# Patient Record
Sex: MALE | Race: WHITE
[De-identification: names, ages, dates, MRNs, and addresses within clinical notes are randomized per-mention and may not be internally consistent; named-entity substitution may affect disease eponyms.]

---

## 2018-12-16 ENCOUNTER — HOSPITAL ENCOUNTER (INPATIENT)
Dept: HOSPITAL 89 - ER | Age: 43
LOS: 5 days | Discharge: HOME | DRG: 195 | End: 2018-12-21
Attending: INTERNAL MEDICINE | Admitting: INTERNAL MEDICINE
Payer: SELF-PAY

## 2018-12-16 VITALS — DIASTOLIC BLOOD PRESSURE: 91 MMHG | SYSTOLIC BLOOD PRESSURE: 142 MMHG

## 2018-12-16 VITALS
WEIGHT: 155.5 LBS | BODY MASS INDEX: 21.06 KG/M2 | HEIGHT: 72 IN | BODY MASS INDEX: 21.06 KG/M2 | HEIGHT: 72 IN | WEIGHT: 155.5 LBS

## 2018-12-16 VITALS — SYSTOLIC BLOOD PRESSURE: 131 MMHG | DIASTOLIC BLOOD PRESSURE: 95 MMHG

## 2018-12-16 DIAGNOSIS — F17.210: ICD-10-CM

## 2018-12-16 DIAGNOSIS — R09.02: ICD-10-CM

## 2018-12-16 DIAGNOSIS — I10: ICD-10-CM

## 2018-12-16 DIAGNOSIS — J09.X1: Primary | ICD-10-CM

## 2018-12-16 LAB — PLATELET COUNT, AUTOMATED: 187 K/UL (ref 150–450)

## 2018-12-16 PROCEDURE — 82435 ASSAY OF BLOOD CHLORIDE: CPT

## 2018-12-16 PROCEDURE — 84460 ALANINE AMINO (ALT) (SGPT): CPT

## 2018-12-16 PROCEDURE — 96361 HYDRATE IV INFUSION ADD-ON: CPT

## 2018-12-16 PROCEDURE — 85025 COMPLETE CBC W/AUTO DIFF WBC: CPT

## 2018-12-16 PROCEDURE — 84132 ASSAY OF SERUM POTASSIUM: CPT

## 2018-12-16 PROCEDURE — 94640 AIRWAY INHALATION TREATMENT: CPT

## 2018-12-16 PROCEDURE — 84520 ASSAY OF UREA NITROGEN: CPT

## 2018-12-16 PROCEDURE — 71046 X-RAY EXAM CHEST 2 VIEWS: CPT

## 2018-12-16 PROCEDURE — 82310 ASSAY OF CALCIUM: CPT

## 2018-12-16 PROCEDURE — 82374 ASSAY BLOOD CARBON DIOXIDE: CPT

## 2018-12-16 PROCEDURE — 99284 EMERGENCY DEPT VISIT MOD MDM: CPT

## 2018-12-16 PROCEDURE — 84155 ASSAY OF PROTEIN SERUM: CPT

## 2018-12-16 PROCEDURE — 87502 INFLUENZA DNA AMP PROBE: CPT

## 2018-12-16 PROCEDURE — 96374 THER/PROPH/DIAG INJ IV PUSH: CPT

## 2018-12-16 PROCEDURE — 82565 ASSAY OF CREATININE: CPT

## 2018-12-16 PROCEDURE — 87040 BLOOD CULTURE FOR BACTERIA: CPT

## 2018-12-16 PROCEDURE — 84450 TRANSFERASE (AST) (SGOT): CPT

## 2018-12-16 PROCEDURE — 82947 ASSAY GLUCOSE BLOOD QUANT: CPT

## 2018-12-16 PROCEDURE — 82247 BILIRUBIN TOTAL: CPT

## 2018-12-16 PROCEDURE — 71045 X-RAY EXAM CHEST 1 VIEW: CPT

## 2018-12-16 PROCEDURE — 82040 ASSAY OF SERUM ALBUMIN: CPT

## 2018-12-16 PROCEDURE — 84075 ASSAY ALKALINE PHOSPHATASE: CPT

## 2018-12-16 PROCEDURE — 94667 MNPJ CHEST WALL 1ST: CPT

## 2018-12-16 PROCEDURE — 36415 COLL VENOUS BLD VENIPUNCTURE: CPT

## 2018-12-16 PROCEDURE — 83605 ASSAY OF LACTIC ACID: CPT

## 2018-12-16 PROCEDURE — 84295 ASSAY OF SERUM SODIUM: CPT

## 2018-12-16 RX ADMIN — THIAMINE HYDROCHLORIDE PRN MLS/HR: 100 INJECTION, SOLUTION INTRAMUSCULAR; INTRAVENOUS at 21:13

## 2018-12-16 NOTE — RADIOLOGY IMAGING REPORT
FACILITY: [facility] - [place] 

 

PATIENT NAME: Eliceo Cadena

: 1975

MR: [id number]

V: [id number]

EXAM DATE: 

ORDERING PHYSICIAN: ELIJAH WHEELER

TECHNOLOGIST: 

 

Location: [facility]

Patient: Eliceo Cadena

: 1975

MRN: [id number]

Visit/Account:[id number]

Date of Sevice: 2018

 

ACCESSION #: [id number]

 

EXAMINATION: Chest 2 Views

 

HISTORY:  Cough and fever

 

COMPARISON:  None.

 

FINDINGS:

Normal and symmetric lung volumes.  There are mixed interstitial and airspace opacities in the perihi
lar aspect of both lungs, greater on the right, suspicious for multifocal pneumonitis.

 

No pleural effusion or pneumothorax.

 

Normal cardiomediastinal silhouette.

 

Visualized osseous structures appear intact.

 

IMPRESSION:

Patchy perihilar infiltrates are suspicious for multifocal pneumonitis.

 

Report Dictated By: Raphael Pinedo MD at 2018 6:29 PM

 

Report E-Signed By: Raphael Pinedo MD  at 2018 6:31 PM

 

WSN:[id number]

## 2018-12-16 NOTE — HISTORY & PHYSICAL
History of Present Illness


Chief Complaint


"I feel terrible"


History of Present Illness


42yo male with PMHx significant for HTN, spontaneous PNTX s/p chest 


tube/pleurodesis. He is an  from [place] driving through the 


[facility]. He states he began feeling feverish, lightheaded, having 


HA/diffuse body aches, coughing approximately 3-4 days ago. He and his co-


have had the same symptoms. They stopped at a local truck stop to "get over" the


symptoms. Unfortunately, everything seemed to worsen including increasing d


yspnea, poor appetite, diarrhea. He contacted EMS and was transported to the [facility]


ER for evaluation. He was found to be hypoxic with bilateral perihilar 


infiltrates and influenza A positive. He was recommended for admission.





History


Problems:  


(1) HTN (hypertension)


Status:  Chronic


(2) Spontaneous pneumothorax


Status:  Resolved


Home Meds


No Active Prescriptions or Reported Meds


Allergies:  


Coded Allergies:  


     No Known Drug Allergies (Unverified , 18)


Other Social/Family Hx


He reports his family has been very healthy with no known problems.


Hx Smoking:  Yes


Smoking Status:  Current: Every Day Smoker (2ppd)


Hx Alcohol Use:  No





Review of Systems


Constitutional:  Fever, Chills


Neurological:  Weakness, Dizziness


Cardiovascular:  No Chest Pain


Respiratory:  Shortness of Breath, Cough


Gastrointestinal:  Diarrhea; No Hematemesis, No Hematochezia, No Melena, No 


Abdominal Pain


Genitourinary:  No Dysuria, No Hematuria


Musculoskeletal:  Pain





Exam


Vital Signs





Vital Signs








  Date Time  Temp Pulse Resp B/P (MAP) Pulse Ox O2 Delivery O2 Flow Rate FiO2


 


18 20:06 101.4       


 


18 20:04  110 7  91   


 


18 19:14      Nasal Cannula 3 


 


18 18:49    128/92 (104)    








General Appearance:  Alert, Awake


Neuro:  No Gross deficits


Eyes:  PERRLA, Other (sclera anicteric)


Neck:  No Masses


Cardiovascular:  Regular Rate and Rhythm, No Edema, No JVD


Respiratory:  Other (few scattered rhonchi)


Chest:  No Tenderness


GI:  Abd Soft and Non-Tender, Other (BS present)


:  No CVA Tenderness


Lymph:  No Adenopathy


Extremities:  Warm, Perfused


Integumentary:  Skin Intact without Lesion / Mass, Other (multiple tatoos)


Psych:  Alert & Oriented X3





Medical Decision Making


Data Points


Result Diagram:  


18 1740                                                                   


            18 1740














Item Value  Date Time


 


Albumin 4.4 g/dl 18 1740


 


Total Protein 7.8 g/dl 18 1740


 


Alkaline Phosphatase 67 U/L 18 1740


 


Alanine Aminotransferase (ALT/SGPT) 35 U/L 18 1740


 


Aspartate Amino Transf (AST/SGOT) 109 U/L H 18 1740


 


Total Bilirubin 0.7 mg/dl 18 1740


 


Calcium Level 9.4 mg/dl 18 1740


 


Lactate 1.9 mmol/L 18 174


 


Influenza Virus Type B (PCR) Negative 18 172


 


Influenza Virus Type A (PCR) Positive 18 172











EKG / Imaging


Imaging


PATIENT NAME: Eliceo Cadena


: 1975


MR: [id number]


V: [id number]


EXAM DATE: [id number]


ORDERING PHYSICIAN: ELIJAH WHEELER


TECHNOLOGIST: 


 


Location: [facility] [place]


Patient: Eliceo Cadena


: 1975


MRN: [id number]


Visit/Account:[id number]


Date of Sevice: 2018


 


ACCESSION #: [id number]


 


EXAMINATION: Chest 2 Views


 


HISTORY:  Cough and fever


 


COMPARISON:  None.


 


FINDINGS:


Normal and symmetric lung volumes.  There are mixed interstitial and airspace 


opacities in the perihilar aspect of both lungs, greater on the right, 


suspicious for multifocal pneumonitis.


 


No pleural effusion or pneumothorax.


 


Normal cardiomediastinal silhouette.


 


Visualized osseous structures appear intact.


 


IMPRESSION:


Patchy perihilar infiltrates are suspicious for multifocal pneumonitis.


 


Report Dictated By: Raphael Pinedo MD at 2018 6:29 PM


 


Report E-Signed By: Raphael Pinedo MD  at 2018 6:31 PM


 


WSN:[facility]





Assessment and Plan


Problems:  


(1) Influenza A


Status:  Acute


Assessment & Plan:  It sounds as if he has had symptoms for several days, but 


due to severity of illness will start on Tamiflu 75mg PO BID for a minimum of 5 


days. Will also give generous IV fluids as he has not been taking PO very well. 


Watch closely.





(2) Bilateral pneumonia


Status:  Acute


Assessment & Plan:  He may have influenza pneumonia, but could also have a 


bacterial pneumonia as well. He does not have significant risk factors for MRSA.


Will cover with IV Rocephin and Levaquin. Will also give supplemental oxygen and


respiratory treatments as needed.





(3) HTN (hypertension)


Status:  Chronic


Assessment & Plan:  He had previously been treated with clonidine 0.1mg PO BID, 


but he admits he has not taken this for several weeks. Will watch BPs and 


consider therapy if he has significant sustained elevation.








Venous Thromboembolism


Antithrombotics


Is Pt On Any Antithrombotics?:  Yes





Exam


Sepsis Risk:  No Definite Risk





Problem Qualifiers





(1) Bilateral pneumonia:  


Pneumonia type:  due to unspecified organism  Lung location:  unspecified part 


of lung  Qualified Codes:  J18.9 - Pneumonia, unspecified organism








ANDRÉS WARE MD            Dec 16, 2018 21:08

## 2018-12-16 NOTE — ER REPORT
History and Physical


Time Seen By MD:  17:41


Hx. of Stated Complaint:  


generalized body aches, dry hacking cough, fever/chills, diarrhea since 


Thursday. 


 (ELIJAH WHEELER DO)


HPI/ROS


CHIEF COMPLAINT: fever and cough





HISTORY OF PRESENT ILLNESS: Pt started feeling sick since Thursday. Started with


non productive cough, fever, light headed, dizzy, and blurred vision. Pt also 


with bodyaches. Pt is a  and pulled over at [employer]. EMS 


was called due to symptoms getting worse. Ems pt was wheezing and given a 


treatment in route. Pt arrived on 4 liters oxygen via ems with pulse ox at 94%. 


Pt has some nausea. no vomiting or diarrhea.  





REVIEW OF SYSTEMS:


Constitutional: + fever, + chills.


Eyes: No discharge.


ENT: No sore throat. 


Cardiovascular: No chest pain, no palpitations.


Respiratory: + cough, + shortness of breath.


Gastrointestinal: No abdominal pain, no vomiting.


Genitourinary: No hematuria.


Musculoskeletal: + bodyaches


Skin: No rashes.


Neurological: + headache, + dizzy


 (ELIJAH WHEELER DO)


Allergies:  


Coded Allergies:  


     No Known Drug Allergies (Unverified , 12/16/18)


Home Meds


Reported Medications


Clonidine Hcl (CLONIDINE HCL) 0.1 Mg Tablet, 0.1 MG PO BID, TAB


   12/16/18


Past Medical/Surgical History


pmhx: htn, spontaneous ptx


Pshx, ptx repair


 (ELIJAH WHEELER DO)


Reviewed Nurses Notes:  Yes


Old Medical Records Reviewed:  No


 (ELIJAH WHEELER DO)


Hx Smoking:  Yes


Smoking Status:  Current: Every Day Smoker (2ppd)


Hx Alcohol Use:  No


 (ELIJAH WHEELER DO)


Constitutional





Vital Sign - Last 24 Hours








 12/16/18 12/16/18 12/16/18 12/16/18





 17:29 17:31 17:34 17:34


 


Temp    103.1


 


Pulse ???   135


 


Resp    22


 


B/P (MAP)  147/101 (116) 142/100 (114) 147/101


 


Pulse Ox    91


 


O2 Delivery    Nasal Cannula


 


    





 12/16/18 12/16/18 12/16/18 12/16/18





 17:44 17:59 18:14 18:29


 


Pulse 130 119 ??? 115


 


Resp 19 9  6


 


Pulse Ox 92 94  93





 12/16/18 12/16/18 12/16/18 12/16/18





 18:44 18:49 18:58 18:59


 


Pulse 126  115 


 


Resp 21  18 


 


B/P (MAP)  128/92 (104)  


 


Pulse Ox 93   93


 


O2 Delivery    Nasal Cannula


 


O2 Flow Rate    3.0





 12/16/18 12/16/18 12/16/18 12/16/18





 18:59 19:02 19:14 19:19


 


Pulse 117 126 119 119


 


Resp 12 18 0 31


 


Pulse Ox 98  93 93


 


O2 Delivery   Nasal Cannula 


 


O2 Flow Rate   3 





 12/16/18 12/16/18 12/16/18 12/16/18





 19:24 19:27 19:29 19:34


 


Temp  103.5  


 


Pulse 116  123 115


 


Resp 43  22 12


 


Pulse Ox 92  94 90


 


    





 12/16/18 12/16/18 12/16/18 





 19:39 19:44 19:49 


 


Pulse 107 114 108 


 


Resp 40 25 28 


 


Pulse Ox 92 91 94 





 (ATIYA SINGH DO)


Physical Exam


General Appearance: The patient is alert, has no immediate need for airway 


protection and no signs of toxicity.


Eyes: Pupils equal and round no pallor or injection, EOMI


ENT:  no pharyngeal erythema or exudates, Mucous membranes are moist, TM are nl 


b/l


Respiratory: There are decreased breath sounds with mild wheezing, no 


retractions


Cardiovascular: + tachy. pulses are equal and symmetrical


Gastrointestinal:  Abdomen is soft and non tender, no masses, bowel sounds 


normal, no guarding, no rigidity or rebound


Neurological: Cranial nerves II-XII grossly intact, no sensory or motor loss


Skin: Warm and dry, no rashes.


Musculoskeletal: Neck is supple non tender, no vertebral tenderness


Extremities are nontender, non swollen and have full range of motion.








DIFFERENTIAL DIAGNOSIS: After history and physical exam differential diagnosis 


was considered for pneumonia, influenza, bronchitis


 (LAURORA,ELIJAH V DO)





Medical Decision Making


Data Points


Result Diagram:  


12/17/18 0559                                                                   


            12/17/18 0559





Laboratory





Hematology








Test


 12/16/18


17:25 12/16/18


17:40


 


Influenza Virus Type A (PCR)


 Positive


(NEGATIVE) 





 


Influenza Virus Type B (PCR)


 Negative


(NEGATIVE) 





 


Peripheral Blood Smear  Yes Y/N 


 


Lactate


 


 1.9 mmol/L


(0.7-2.1)








Chemistry








Test


 12/16/18


17:25 12/16/18


17:40


 


Influenza Virus Type A (PCR)


 Positive


(NEGATIVE) 





 


Influenza Virus Type B (PCR)


 Negative


(NEGATIVE) 





 


Peripheral Blood Smear  Yes Y/N 


 


Lactate


 


 1.9 mmol/L


(0.7-2.1)





 (ATIYA SINGH DO)


Microbiology





Microbiology








 Date/Time


Source Procedure


Growth Status





 


 12/16/18 18:01


Blood Blood Culture - Preliminary


NO GROWTH AFTER 1 DAY, REINCUBATED Resulted


 


 12/16/18 17:40


Blood Blood Culture - Preliminary


NO GROWTH AFTER 1 DAY, REINCUBATED Resulted





 (ATIYA SINGH DO)





EKG/Imaging


Imaging


X-ray: Two-view chest x-ray was obtained.  I viewed the images myself on the 


PACS system.  My interpretation of the images is: Bilateral infiltrates.  





HISTORY:  Cough and fever


 


COMPARISON:  None.


 


FINDINGS:


Normal and symmetric lung volumes.  There are mixed interstitial and airspace 


opacities in the perihilar aspect of both lungs, greater on the right, suspicio


us for multifocal pneumonitis.


 


No pleural effusion or pneumothorax.


 


Normal cardiomediastinal silhouette.


 


Visualized osseous structures appear intact.


 


IMPRESSION:


Patchy perihilar infiltrates are suspicious for multifocal pneumonitis.


.


 (ATIYA SINGH DO)


ED Course/Re-evaluation


Clinical Indication for ER IV:  Hydration, IV Access


ED Course


check labs including blood cultures and cxr. 





 12/16/2018 6:18:53 pm Signed out to Dr. Singh


 ([facility]


Clinical Indication for ER IV:  Hydration, IV Access


ED Course


Care was assumed at shift change from Dr. Lal.  Patient with a fever to 103.


 Grossly hypoxic brought in by EMS requiring 4 L to maintain his saturations in 


the low 90s.  His chest x-ray shows bilateral infiltrates.  He is a 13,000 white


count with a left shift.  His influenza A returns positive.  His lactate is 


normal.  He was aggressively fluid resuscitated.  He was given Tylenol and 


ibuprofen for fever control.  Despite that he still feeling quite ill..








 12/16/2018 7:29:50 pm case discussed with Dr. Kranthi De Jesus hospitalist on-


call, who accepts patient for admission


Decision to Disposition Date:  Dec 16, 2018


Decision to Disposition Time:  18:54


 (ATIYA SINGH DO)





Depart


Departure


Latest Vital Signs





Vital Signs








  Date Time  Temp Pulse Resp B/P (MAP) Pulse Ox O2 Delivery O2 Flow Rate FiO2


 


12/16/18 19:49  108 28  94   


 


12/16/18 19:27 103.5       


 


12/16/18 19:14      Nasal Cannula 3 


 


12/16/18 18:49    128/92 (104)    





 (ATIYA SINGH DO)


Impression:  


   Primary Impression:  


   Influenza A


   Additional Impression:  


   Bilateral pneumonia


Condition:  Improved


Disposition:  Admitted from ER





Problem Qualifiers








   Additional Impression:  


   Bilateral pneumonia


   Pneumonia type:  due to unspecified organism  Lung location:  unspecified 


   part of lung  Qualified Codes:  J18.9 - Pneumonia, unspecified organism








ELIJAH WHEELER DO            Dec 16, 2018 18:18


ATIYA SINGH DO              Dec 16, 2018 18:56

## 2018-12-17 VITALS — DIASTOLIC BLOOD PRESSURE: 90 MMHG | SYSTOLIC BLOOD PRESSURE: 137 MMHG

## 2018-12-17 VITALS — DIASTOLIC BLOOD PRESSURE: 96 MMHG | SYSTOLIC BLOOD PRESSURE: 130 MMHG

## 2018-12-17 VITALS — DIASTOLIC BLOOD PRESSURE: 91 MMHG | SYSTOLIC BLOOD PRESSURE: 140 MMHG

## 2018-12-17 VITALS — DIASTOLIC BLOOD PRESSURE: 80 MMHG | SYSTOLIC BLOOD PRESSURE: 128 MMHG

## 2018-12-17 LAB — PLATELET COUNT, AUTOMATED: 145 K/UL (ref 150–450)

## 2018-12-17 RX ADMIN — OSELTAMIVIR PHOSPHATE SCH MG: 75 CAPSULE ORAL at 21:38

## 2018-12-17 RX ADMIN — ENOXAPARIN SODIUM SCH MG: 100 INJECTION SUBCUTANEOUS at 09:34

## 2018-12-17 RX ADMIN — THIAMINE HYDROCHLORIDE PRN MLS/HR: 100 INJECTION, SOLUTION INTRAMUSCULAR; INTRAVENOUS at 17:38

## 2018-12-17 RX ADMIN — IBUPROFEN PRN MG: 600 TABLET ORAL at 14:22

## 2018-12-17 RX ADMIN — BENZOCAINE AND MENTHOL PRN EACH: 15; 3.6 LOZENGE ORAL at 14:15

## 2018-12-17 RX ADMIN — LEVOFLOXACIN SCH MG: 750 TABLET, FILM COATED ORAL at 12:34

## 2018-12-17 RX ADMIN — ACETAMINOPHEN PRN MG: 325 TABLET ORAL at 12:34

## 2018-12-17 RX ADMIN — IBUPROFEN PRN MG: 600 TABLET ORAL at 08:22

## 2018-12-17 RX ADMIN — THIAMINE HYDROCHLORIDE PRN MLS/HR: 100 INJECTION, SOLUTION INTRAMUSCULAR; INTRAVENOUS at 05:57

## 2018-12-17 RX ADMIN — ACETAMINOPHEN PRN MG: 325 TABLET ORAL at 19:57

## 2018-12-17 RX ADMIN — OSELTAMIVIR PHOSPHATE SCH MG: 75 CAPSULE ORAL at 09:33

## 2018-12-17 RX ADMIN — BENZOCAINE AND MENTHOL PRN EACH: 15; 3.6 LOZENGE ORAL at 12:34

## 2018-12-17 RX ADMIN — IBUPROFEN PRN MG: 600 TABLET ORAL at 01:30

## 2018-12-17 NOTE — HOSPITALIST PROGRESS NOTE
Subjective


Progress Notes


Subjective


This patient was admitted for influenza and pneumonia.  He had no acute events 


overnight.





Patient Complains of:


Cardiovascular:  No: Chest Pain


Respiratory:  No: Shortness of Breath





Physical Exam





Vital Signs








  Date Time  Temp Pulse Resp B/P (MAP) Pulse Ox O2 Delivery O2 Flow Rate FiO2


 


12/17/18 09:36 98.5 90 20 140/91 (107) 93 Nasal Cannula 2.5 














Intake and Output 


 


 12/17/18





 06:59


 


Intake Total 1180 ml


 


Balance 1180 ml


 


 


 


Intake Oral 200 ml


 


IV Total 980 ml


 


# Voids 3








Cardiovascular:  Regular Rate and Rhythm


Respiratory:  Clear to Auscultation


Result Diagram:  


12/17/18 0559                                                                   


            12/17/18 0559














Item Value  Date Time


 


Influenza Virus Type A (PCR) Positive 12/16/18 1725

















Item Value  Date Time


 


Blood Culture - Preliminary Resulted 12/16/18 1801





Blood NO GROWTH AFTER 1 DAY, REINCUBATED 


 


Blood Culture - Preliminary Resulted 12/16/18 1740





Blood NO GROWTH AFTER 1 DAY, REINCUBATED 











Assessment and Plan


Problems:  


(1) Influenza A


Status:  Acute


Assessment & Plan:  He did test positive for influenza A.  He has been started 


on treatment with Tamiflu.





(2) Bilateral pneumonia


Status:  Acute


Assessment & Plan:  His chest x-ray did show bilateral interstitial infiltrates.


 He has been started on empiric treatment with ceftriaxone and levofloxacin.  We


have stopped the ceftriaxone and placed him on oral levofloxacin.





(3) HTN (hypertension)


Status:  Chronic


Assessment & Plan:  He had previously been treated with clonidine, but has not 


been taking this regularly.








Exam


Sepsis Risk:  Sepsis Risk





Problem Qualifiers





(1) Bilateral pneumonia:  


Pneumonia type:  due to unspecified organism  Lung location:  unspecified part 


of lung  Qualified Codes:  J18.9 - Pneumonia, unspecified organism


(2) HTN (hypertension):  


Hypertension type:  essential hypertension  Qualified Codes:  I10 - Essential 


(primary) hypertension








FOREST LOONEY DO                  Dec 17, 2018 10:11

## 2018-12-17 NOTE — ANTIMICROBIAL STEWARDSHIP
Antimicrobial Stewardship


Empiricly appropriate:  Yes


Significant PMH:  Yes (Spontaneous pneumothorax)


Support empiric regimen:  Yes


Approriate Cultures done:  Yes (Blood Cx x 2 pending)


Organism identified:  Yes (Influenza A (+))


Determine cumulative duration:  Day 2 today 12/17/18


Determine standard duration:  5 days of both levofloxacin and tamiflu


Comment


43 y M from [place] passing through as a  who presented to the ED 


with SOB, cough, fever, lightheaded


 


Tmax 103.5


WBC 12.9 - 10


Influenza A (+)


Blood Cx x 2 NGTD


Chest Xray- bilateral patchy perihilar infiltrates, multifocal pneumonitis





Plan to treat with 5 days of Tamiflu and 5 days of levofloxacin for a possible 


secondary pneumonia.  





Mally Cloud, PharmD, BCOP











MALLY CLOUD               Dec 17, 2018 12:09

## 2018-12-18 VITALS — SYSTOLIC BLOOD PRESSURE: 98 MMHG

## 2018-12-18 VITALS — SYSTOLIC BLOOD PRESSURE: 129 MMHG | DIASTOLIC BLOOD PRESSURE: 93 MMHG

## 2018-12-18 VITALS — SYSTOLIC BLOOD PRESSURE: 128 MMHG | DIASTOLIC BLOOD PRESSURE: 89 MMHG

## 2018-12-18 VITALS — SYSTOLIC BLOOD PRESSURE: 116 MMHG | DIASTOLIC BLOOD PRESSURE: 87 MMHG

## 2018-12-18 VITALS — DIASTOLIC BLOOD PRESSURE: 79 MMHG | SYSTOLIC BLOOD PRESSURE: 118 MMHG

## 2018-12-18 RX ADMIN — GUAIFENESIN PRN MG: 100 SOLUTION ORAL at 14:52

## 2018-12-18 RX ADMIN — IPRATROPIUM BROMIDE AND ALBUTEROL SULFATE SCH ML: .5; 3 SOLUTION RESPIRATORY (INHALATION) at 16:59

## 2018-12-18 RX ADMIN — ACETAMINOPHEN PRN MG: 325 TABLET ORAL at 11:44

## 2018-12-18 RX ADMIN — ACETAMINOPHEN PRN MG: 325 TABLET ORAL at 03:23

## 2018-12-18 RX ADMIN — IBUPROFEN PRN MG: 600 TABLET ORAL at 14:52

## 2018-12-18 RX ADMIN — GUAIFENESIN PRN MG: 100 SOLUTION ORAL at 19:22

## 2018-12-18 RX ADMIN — ACETAMINOPHEN PRN MG: 325 TABLET ORAL at 19:21

## 2018-12-18 RX ADMIN — IBUPROFEN PRN MG: 600 TABLET ORAL at 01:20

## 2018-12-18 RX ADMIN — OSELTAMIVIR PHOSPHATE SCH MG: 75 CAPSULE ORAL at 09:38

## 2018-12-18 RX ADMIN — IPRATROPIUM BROMIDE AND ALBUTEROL SULFATE SCH ML: .5; 3 SOLUTION RESPIRATORY (INHALATION) at 08:49

## 2018-12-18 RX ADMIN — LEVOFLOXACIN SCH MG: 750 TABLET, FILM COATED ORAL at 09:38

## 2018-12-18 RX ADMIN — OSELTAMIVIR PHOSPHATE SCH MG: 75 CAPSULE ORAL at 20:13

## 2018-12-18 RX ADMIN — IBUPROFEN PRN MG: 600 TABLET ORAL at 07:30

## 2018-12-18 RX ADMIN — IPRATROPIUM BROMIDE AND ALBUTEROL SULFATE SCH ML: .5; 3 SOLUTION RESPIRATORY (INHALATION) at 12:24

## 2018-12-18 RX ADMIN — BENZOCAINE AND MENTHOL PRN EACH: 15; 3.6 LOZENGE ORAL at 07:29

## 2018-12-18 RX ADMIN — ENOXAPARIN SODIUM SCH MG: 100 INJECTION SUBCUTANEOUS at 09:38

## 2018-12-18 RX ADMIN — BENZOCAINE AND MENTHOL PRN EACH: 15; 3.6 LOZENGE ORAL at 01:20

## 2018-12-18 NOTE — HOSPITALIST PROGRESS NOTE
Subjective


Progress Notes


Subjective


He was admitted for Influenza and Pneumonia. He reports some improvement in 


symptoms. However, he is still requiring 5 liters of oxygen.





Patient Complains of:


Cardiovascular:  No: Chest Pain


Respiratory:  Cough, Congestion, Shortness of Breath





Physical Exam





Vital Signs








  Date Time  Temp Pulse Resp B/P (MAP) Pulse Ox O2 Delivery O2 Flow Rate FiO2


 


12/18/18 08:58  98 18     


 


12/18/18 08:58      Nasal Cannula 4.5 


 


12/18/18 08:49     94   


 


12/18/18 07:21 98.6   118/79 (92)    














Intake and Output 


 


 12/18/18





 07:00


 


Intake Total 2566 ml


 


Balance 2566 ml


 


 


 


Intake Oral 1580 ml


 


IV Total 986 ml


 


# Voids 5








General Appearance:  Alert, Awake, No Acute Distress, Afebrile


Neuro:  No Gross deficits


Cardiovascular:  Regular Rate and Rhythm


Respiratory:  No Respiratory Distress, Other (diminished lung sounds throughout)


GI:  Soft and Non-Tender


Psych:  Alert & Oriented X3, Appropriate Mood & Affect


Result Diagram:  


12/17/18 0559                                                                   


            12/17/18 0559








Assessment and Plan


Problems:  


(1) Influenza A


Status:  Acute


Assessment & Plan:  He did test positive for influenza A.  He has been started 


on treatment with Tamiflu.





(2) Bilateral pneumonia


Status:  Acute


Assessment & Plan:  His chest x-ray did show bilateral interstitial infiltrates.


 He has been started on empiric treatment with ceftriaxone and levofloxacin.  We


have stopped the ceftriaxone and placed him on oral levofloxacin. We will add 


nebulizers and guaifenesin today.





(3) HTN (hypertension)


Status:  Chronic


Assessment & Plan:  He had previously been treated with clonidine, but has not 


been taking this regularly.








Exam


Sepsis Risk:  No Definite Risk





Problem Qualifiers





(1) Bilateral pneumonia:  


Pneumonia type:  due to unspecified organism  Lung location:  unspecified part 


of lung  Qualified Codes:  J18.9 - Pneumonia, unspecified organism


(2) HTN (hypertension):  


Hypertension type:  essential hypertension  Qualified Codes:  I10 - Essential 


(primary) hypertension








TYLER BROWNP            Dec 18, 2018 11:15

## 2018-12-19 VITALS — SYSTOLIC BLOOD PRESSURE: 121 MMHG | DIASTOLIC BLOOD PRESSURE: 81 MMHG

## 2018-12-19 VITALS — DIASTOLIC BLOOD PRESSURE: 94 MMHG | SYSTOLIC BLOOD PRESSURE: 138 MMHG

## 2018-12-19 VITALS — DIASTOLIC BLOOD PRESSURE: 78 MMHG | SYSTOLIC BLOOD PRESSURE: 118 MMHG

## 2018-12-19 VITALS — DIASTOLIC BLOOD PRESSURE: 88 MMHG | SYSTOLIC BLOOD PRESSURE: 140 MMHG

## 2018-12-19 LAB — PLATELET COUNT, AUTOMATED: 161 K/UL (ref 150–450)

## 2018-12-19 RX ADMIN — LEVOFLOXACIN SCH MG: 750 TABLET, FILM COATED ORAL at 10:13

## 2018-12-19 RX ADMIN — IPRATROPIUM BROMIDE AND ALBUTEROL SULFATE SCH ML: .5; 3 SOLUTION RESPIRATORY (INHALATION) at 12:00

## 2018-12-19 RX ADMIN — SODIUM CHLORIDE SCH MG: 900 INJECTION, SOLUTION INTRAVENOUS at 17:17

## 2018-12-19 RX ADMIN — OSELTAMIVIR PHOSPHATE SCH MG: 75 CAPSULE ORAL at 10:13

## 2018-12-19 RX ADMIN — IPRATROPIUM BROMIDE AND ALBUTEROL SULFATE SCH ML: .5; 3 SOLUTION RESPIRATORY (INHALATION) at 05:21

## 2018-12-19 RX ADMIN — ENOXAPARIN SODIUM SCH MG: 100 INJECTION SUBCUTANEOUS at 10:14

## 2018-12-19 RX ADMIN — IPRATROPIUM BROMIDE AND ALBUTEROL SULFATE SCH ML: .5; 3 SOLUTION RESPIRATORY (INHALATION) at 17:22

## 2018-12-19 RX ADMIN — OSELTAMIVIR PHOSPHATE SCH MG: 75 CAPSULE ORAL at 21:09

## 2018-12-19 NOTE — RADIOLOGY IMAGING REPORT
[place] [facility] [place] 

 

PATIENT NAME: Eliceo Cadena

: 1975

MR: [id number]

V: [id number]

EXAM DATE: 

ORDERING PHYSICIAN: SMOOTH DAY

TECHNOLOGIST: 

 

Location: [facility] [place]

Patient: Eliceo Cadena

: 1975

MRN: [id number]

Visit/Account:[id number]

Date of Sevice: 2018

 

ACCESSION #: [id number]

 

Exam type: CHEST SINGLE AP

 

History: hypoxia, pneumonia

 

Comparison: 2018.

 

Findings:

 

There has been marked interval worsening of the interstitial and alveolar infiltrates throughout both
 lungs particularly prominent in the mid to lower lung zones.  No evidence of pleural effusions.  The
 cardiac silhouette appears normal.

 

IMPRESSION:

 

1.  Marked interval worsening of the bilateral pulmonary infiltrates in the mid to lower lung zones c
onsistent with the history of pneumonia

Results were called to SMOOTH DAY at 2018 10:21 AM.

 

Report Dictated By: Lori Roman MD at 2018 10:17 AM

 

Report E-Signed By: Lori Roman MD  at 2018 10:21 AM

 

WSN:[id number]

## 2018-12-19 NOTE — HOSPITALIST PROGRESS NOTE
Subjective


Progress Notes


Subjective


He reports overall improvement.  However, he still has a high O2 requirement 


this morning.





Physical Exam





Vital Signs








  Date Time  Temp Pulse Resp B/P (MAP) Pulse Ox O2 Delivery O2 Flow Rate FiO2


 


12/19/18 05:30     94 Oxy Mask 5.0 


 


12/19/18 05:22  109 26     


 


12/19/18 03:18 98.9   138/94 (109)    














Intake and Output 


 


 12/19/18





 07:00


 


Intake Total 960 ml


 


Balance 960 ml


 


 


 


Intake Oral 960 ml


 


# Voids 5


 


# Bowel Movements 1








General Appearance:  Alert, Awake, No Acute Distress


Cardiovascular:  Regular Rate and Rhythm


Respiratory:  Other (Clear, but much coughing.  Moving air to the bases well)


Result Diagram:  


12/17/18 0559                                                                   


            12/17/18 0559








Assessment and Plan


Problems:  


(1) Bilateral pneumonia


Status:  Acute


Assessment & Plan:  He presented with HA, diffuse body aches, and coughing for 


3-4 days prior to admission.  His presenting chest x-ray did show bilateral 


interstitial infiltrates.  He has been started on empiric treatment with 


ceftriaxone and levofloxacin.  We have stopped the ceftriaxone and placed him on


oral levofloxacin.  He continues to have a high O2 requirement at 5 liters.  


Certainly, his smoking hx and previous spontaneous pneumothorax with pleurodesis


are contributing, but repeat CXR today shows significant worsening in the 


bilateral infiltrates.  He is afebrile.  Will recheck wbc and if it is elevated 


will add Vancomycin.  Will add methylprednisolone secondary to the long smoking 


hx.  Repeat CXR tomorrow.





(2) Influenza A


Status:  Acute


Assessment & Plan:  He did test positive for influenza A.  He has been started 


on treatment with Tamiflu.





(3) HTN (hypertension)


Status:  Chronic


Assessment & Plan:  He had previously been treated with clonidine, but has not 


been taking this regularly.








Exam


Sepsis Risk:  No Definite Risk





Problem Qualifiers





(1) Bilateral pneumonia:  


Pneumonia type:  due to unspecified organism  Lung location:  unspecified part 


of lung  Qualified Codes:  J18.9 - Pneumonia, unspecified organism


(2) HTN (hypertension):  


Hypertension type:  essential hypertension  Qualified Codes:  I10 - Essential 


(primary) hypertension








SMOOTH DAY MD               Dec 19, 2018 10:42

## 2018-12-20 VITALS — DIASTOLIC BLOOD PRESSURE: 73 MMHG | SYSTOLIC BLOOD PRESSURE: 118 MMHG

## 2018-12-20 VITALS — DIASTOLIC BLOOD PRESSURE: 80 MMHG | SYSTOLIC BLOOD PRESSURE: 111 MMHG

## 2018-12-20 VITALS — SYSTOLIC BLOOD PRESSURE: 126 MMHG | DIASTOLIC BLOOD PRESSURE: 75 MMHG

## 2018-12-20 VITALS — DIASTOLIC BLOOD PRESSURE: 84 MMHG | SYSTOLIC BLOOD PRESSURE: 126 MMHG

## 2018-12-20 RX ADMIN — SODIUM CHLORIDE SCH MG: 900 INJECTION, SOLUTION INTRAVENOUS at 17:25

## 2018-12-20 RX ADMIN — OSELTAMIVIR PHOSPHATE SCH MG: 75 CAPSULE ORAL at 21:04

## 2018-12-20 RX ADMIN — LEVOFLOXACIN SCH MG: 750 TABLET, FILM COATED ORAL at 09:49

## 2018-12-20 RX ADMIN — IPRATROPIUM BROMIDE AND ALBUTEROL SULFATE SCH ML: .5; 3 SOLUTION RESPIRATORY (INHALATION) at 05:43

## 2018-12-20 RX ADMIN — IPRATROPIUM BROMIDE AND ALBUTEROL SULFATE SCH ML: .5; 3 SOLUTION RESPIRATORY (INHALATION) at 11:06

## 2018-12-20 RX ADMIN — SODIUM CHLORIDE SCH MG: 900 INJECTION, SOLUTION INTRAVENOUS at 09:48

## 2018-12-20 RX ADMIN — IPRATROPIUM BROMIDE AND ALBUTEROL SULFATE SCH ML: .5; 3 SOLUTION RESPIRATORY (INHALATION) at 17:04

## 2018-12-20 RX ADMIN — OSELTAMIVIR PHOSPHATE SCH MG: 75 CAPSULE ORAL at 09:48

## 2018-12-20 RX ADMIN — IBUPROFEN PRN MG: 600 TABLET ORAL at 18:09

## 2018-12-20 RX ADMIN — ENOXAPARIN SODIUM SCH MG: 100 INJECTION SUBCUTANEOUS at 09:49

## 2018-12-20 RX ADMIN — SODIUM CHLORIDE SCH MG: 900 INJECTION, SOLUTION INTRAVENOUS at 00:53

## 2018-12-20 NOTE — HOSPITALIST PROGRESS NOTE
Subjective


Progress Notes


Subjective


He reports feeling somewhat improved. No fever.





Physical Exam





Vital Signs








  Date Time  Temp Pulse Resp B/P (MAP) Pulse Ox O2 Delivery O2 Flow Rate FiO2


 


12/20/18 08:01     90 Nasal Cannula 10.0 


 


12/20/18 05:44  78 22     


 


12/20/18 00:59 98.2   111/80 (90)    














Intake and Output 


 


 12/20/18





 07:00


 


Intake Total 500 ml


 


Balance 500 ml


 


 


 


Intake Oral 500 ml


 


# Voids 5








General Appearance:  Alert, Awake


Cardiovascular:  Regular Rate and Rhythm


Respiratory:  Other (few scattered rhonchi rales)


GI:  Soft and Non-Tender


Extremities:  Warm, Perfused


Psych:  Alert & Oriented X3


Result Diagram:  


12/19/18 1044                                                                   


            12/19/18 1044








Assessment and Plan


Problems:  


(1) Bilateral pneumonia


Status:  Acute


Assessment & Plan:  He presented with HA, diffuse body aches, and coughing for 


3-4 days prior to admission.  His presenting chest x-ray did show bilateral 


interstitial infiltrates.  He was started on empiric treatment with IV ceftriax


one and levofloxacin.  We have stopped the ceftriaxone and placed him on oral 


levofloxacin.  He continues to have a high O2 requirement at 6-7 liters.  


Certainly, his smoking hx and previous spontaneous pneumothorax with pleurodesis


are contributing, but repeat CXR yesterday showed significant worsening in the 


bilateral infiltrates.  We added methylprednisolone secondary to the long 


smoking history. His CXR today does show some slight improvements. He is 


afebrile. No changes at this time.





(2) Influenza A


Status:  Acute


Assessment & Plan:  He did test positive for influenza A.  He has been started 


on treatment with Tamiflu. Will plan on a minimum of 7 days of therapy.





(3) HTN (hypertension)


Status:  Chronic


Assessment & Plan:  He had previously been treated with clonidine, but has not 


been taking this regularly.








Exam


Sepsis Risk:  No Definite Risk





Problem Qualifiers





(1) Bilateral pneumonia:  


Pneumonia type:  due to unspecified organism  Lung location:  unspecified part 


of lung  Qualified Codes:  J18.9 - Pneumonia, unspecified organism


(2) HTN (hypertension):  


Hypertension type:  essential hypertension  Qualified Codes:  I10 - Essential 


(primary) hypertension








ANDRÉS WARE MD            Dec 20, 2018 10:49

## 2018-12-20 NOTE — RADIOLOGY IMAGING REPORT
FACILITY: [facility] 

 

PATIENT NAME: Eliceo Cadena

: 1975

MR: [id number]

V: [id number]

EXAM DATE: 

ORDERING PHYSICIAN: SMOOTH DAY

TECHNOLOGIST: 

 

Location: [facility]

Patient: Eliceo Cadena

: 1975

MRN: [id number]

Visit/Account:[id number]

Date of Sevice: 2018

 

ACCESSION #: [id number]

 

Exam type: CHEST SINGLE AP

 

History: pneumonia

 

Comparison: 2018.

 

Findings:

 

Reidentified is bilateral airspace disease greater on the left. There is improved aeration both lungs
 when compared to prior exam. No appreciable pneumothorax or pleural effusion. Biapical pleural scarr
ing is noted.

 

Heart size is normal.

 

Osseous structures are unremarkable.

 

IMPRESSION:

 

1. Decrease in the bibasilar airspace disease when compared to prior examination suggesting improveme
nt.

 

Report Dictated By: Thomas Dunphy, MD at 2018 5:50 AM

 

Report E-Signed By: Thomas Dunphy, MD  at 2018 5:57 AM

 

WSN:[id number]

## 2018-12-21 VITALS — DIASTOLIC BLOOD PRESSURE: 77 MMHG | SYSTOLIC BLOOD PRESSURE: 130 MMHG

## 2018-12-21 VITALS — DIASTOLIC BLOOD PRESSURE: 74 MMHG | SYSTOLIC BLOOD PRESSURE: 114 MMHG

## 2018-12-21 VITALS — SYSTOLIC BLOOD PRESSURE: 116 MMHG | DIASTOLIC BLOOD PRESSURE: 77 MMHG

## 2018-12-21 RX ADMIN — SODIUM CHLORIDE SCH MG: 900 INJECTION, SOLUTION INTRAVENOUS at 00:31

## 2018-12-21 RX ADMIN — OSELTAMIVIR PHOSPHATE SCH MG: 75 CAPSULE ORAL at 09:40

## 2018-12-21 RX ADMIN — LEVOFLOXACIN SCH MG: 750 TABLET, FILM COATED ORAL at 09:35

## 2018-12-21 RX ADMIN — IPRATROPIUM BROMIDE AND ALBUTEROL SULFATE SCH ML: .5; 3 SOLUTION RESPIRATORY (INHALATION) at 12:00

## 2018-12-21 RX ADMIN — IPRATROPIUM BROMIDE AND ALBUTEROL SULFATE SCH ML: .5; 3 SOLUTION RESPIRATORY (INHALATION) at 05:29

## 2018-12-21 RX ADMIN — ENOXAPARIN SODIUM SCH MG: 100 INJECTION SUBCUTANEOUS at 09:35

## 2018-12-21 NOTE — HOSPITALIST DEPART
Discharge Summary


Reason for Hosp/Final Diag:  


(1) Bilateral pneumonia


Status:  Acute


Hospital Course & Plan:  He presented with HA, diffuse body aches, and coughing 


for 3-4 days prior to admission.  His presenting chest x-ray did show bilateral 


interstitial infiltrates.  He was started on empiric treatment with IV ceftri


axone and levofloxacin.  We have stopped the ceftriaxone and placed him on oral 


levofloxacin (treatment completed).  He had a high O2 requirement at 6-7 liters,


but now has reduced to 1.5L. He will need oxygen to go home. Patient does not 


believe he needs oxygen at this point, but we had a long conversation the risks 


of not using oxygen to result in death. He understands the risks and has been 


refusing oxygen at this time. Certainly, his smoking hx and previous spontaneous


pneumothorax with pleurodesis are contributing to the severity.  CXR on 12/9 


showed significant worsening in the bilateral infiltrates, and 


methylprednisolone was added secondary to the long smoking history. His CXR 


12/20 does show some slight improvements. He is afebrile. He will need to follow


up with PCP when he gets home. 





(2) Influenza A


Status:  Acute


Hospital Course & Plan:  He did test positive for influenza A.  He has been 


started on treatment with Tamiflu. Will plan on a minimum of 7 days of therapy.





(3) HTN (hypertension)


Status:  Chronic


Hospital Course & Plan:  He had previously been treated with clonidine, but has 


not been taking this regularly.





Departure


Latest Vital Signs





Vital Signs








 12/21/18 12/21/18





 06:48 09:15


 


Temp 98.0 


 


Pulse 97 


 


Resp 16 


 


B/P (MAP) 114/74 (87) 


 


Pulse Ox  77


 


O2 Delivery Nasal Cannula 


 


O2 Flow Rate 3.0 








Weight (Pounds):  155


Weight (Ounces):  8.0


Result Diagram:  


12/19/18 1044                                                                   


            12/19/18 1044





Condition:  Improved


Discharge:  Home, Self Care





Discharge Instructions


Home Meds


Active Scripts


Prednisone 10 Mg Tab (PREDNISONE 10 MG TAB) 10 Mg Tablet, 10 MG PO AS DIRECTED, 


#60 TAB


   Take 6 tablets for 2 days, then 5 tablets for 3 days, then 4 tablets


   for 3 days, then 3 tablets for 3 days, then 2 tablets for 3 days,


   then 1 tablet for 3 days, then stop.


   Prov:TYLER BROWN FNP         12/21/18


Oseltamivir Phosphate (TAMIFLU) 75 Mg Cap, 75 MG PO BID, #6 CAP


   Prov:TYLER BROWN [facility]         12/21/18


Benzonatate (BENZONATATE) 100 Mg Capsule, 200 MG PO Q8H PRN for cough, #20 


CAPSULE


   Prov:TYLER BROWN [facility]         12/21/18


Albuterol Sulfate (VENTOLIN HFA) 18 Gm Inh, 2 PUFF INH Q4-6H PRN for SHORTNESS 


OF BREATH, #1 INH


   Prov:JUAN BROWNCHANCE MENSAH [facility]         12/21/18


Reported Medications


Clonidine Hcl (CLONIDINE HCL) 0.1 Mg Tablet, 0.1 MG PO BID, TAB


   12/16/18


Diet:  Regular


Activity:  As Tolerated


Special Instructions:  


Please follow up with provider when you get home. 


Continue all medications as prescribed. 


Do not stop taking Prednisone until completed. 


Please use oxygen as prescribed.





Venous Thromboembolism


Antithrombotics


Is Pt On Any Antithrombotics?:  Yes





Problem Qualifiers





(1) Bilateral pneumonia:  


Pneumonia type:  due to unspecified organism  Lung location:  unspecified part 


of lung  Qualified Codes:  J18.9 - Pneumonia, unspecified organism


(2) HTN (hypertension):  


Hypertension type:  essential hypertension  Qualified Codes:  I10 - Essential 


(primary) hypertension








BROWNTYLER MENSAH [facility]            Dec 21, 2018 10:20